# Patient Record
Sex: MALE | Race: WHITE | Employment: FULL TIME | URBAN - METROPOLITAN AREA
[De-identification: names, ages, dates, MRNs, and addresses within clinical notes are randomized per-mention and may not be internally consistent; named-entity substitution may affect disease eponyms.]

---

## 2024-06-25 ENCOUNTER — HOSPITAL ENCOUNTER (EMERGENCY)
Age: 59
Discharge: HOME | End: 2024-06-25
Attending: EMERGENCY MEDICINE
Payer: COMMERCIAL

## 2024-06-25 ENCOUNTER — APPOINTMENT (OUTPATIENT)
Dept: GENERAL RADIOLOGY | Age: 59
End: 2024-06-25
Payer: COMMERCIAL

## 2024-06-25 VITALS
SYSTOLIC BLOOD PRESSURE: 167 MMHG | RESPIRATION RATE: 20 BRPM | HEIGHT: 72 IN | TEMPERATURE: 98.4 F | BODY MASS INDEX: 37.93 KG/M2 | WEIGHT: 280 LBS | HEART RATE: 86 BPM | OXYGEN SATURATION: 99 % | DIASTOLIC BLOOD PRESSURE: 89 MMHG

## 2024-06-25 DIAGNOSIS — I10 UNCONTROLLED HYPERTENSION: Primary | ICD-10-CM

## 2024-06-25 LAB
ANION GAP SERPL CALCULATED.3IONS-SCNC: 12 MMOL/L (ref 9–17)
BASOPHILS # BLD: 0.03 K/UL (ref 0–0.2)
BASOPHILS NFR BLD: 0 % (ref 0–2)
BILIRUB UR QL STRIP: NEGATIVE
BUN SERPL-MCNC: 13 MG/DL (ref 6–20)
BUN/CREAT SERPL: 16 (ref 9–20)
CALCIUM SERPL-MCNC: 8.9 MG/DL (ref 8.6–10.4)
CHLORIDE SERPL-SCNC: 100 MMOL/L (ref 98–107)
CLARITY UR: CLEAR
CO2 SERPL-SCNC: 23 MMOL/L (ref 20–31)
COLOR UR: YELLOW
COMMENT: ABNORMAL
CREAT SERPL-MCNC: 0.8 MG/DL (ref 0.7–1.2)
EOSINOPHIL # BLD: 0.1 K/UL (ref 0–0.44)
EOSINOPHILS RELATIVE PERCENT: 1 % (ref 1–4)
ERYTHROCYTE [DISTWIDTH] IN BLOOD BY AUTOMATED COUNT: 12.6 % (ref 11.8–14.4)
GFR, ESTIMATED: >90 ML/MIN/1.73M2
GLUCOSE SERPL-MCNC: 303 MG/DL (ref 70–99)
GLUCOSE UR STRIP-MCNC: ABNORMAL MG/DL
HCT VFR BLD AUTO: 42.9 % (ref 40.7–50.3)
HGB BLD-MCNC: 15 G/DL (ref 13–17)
HGB UR QL STRIP.AUTO: NEGATIVE
IMM GRANULOCYTES # BLD AUTO: 0.01 K/UL (ref 0–0.3)
IMM GRANULOCYTES NFR BLD: 0 %
KETONES UR STRIP-MCNC: NEGATIVE MG/DL
LEUKOCYTE ESTERASE UR QL STRIP: NEGATIVE
LYMPHOCYTES NFR BLD: 1.8 K/UL (ref 1.1–3.7)
LYMPHOCYTES RELATIVE PERCENT: 26 % (ref 24–43)
MCH RBC QN AUTO: 28.3 PG (ref 25.2–33.5)
MCHC RBC AUTO-ENTMCNC: 35 G/DL (ref 28.4–34.8)
MCV RBC AUTO: 80.9 FL (ref 82.6–102.9)
MONOCYTES NFR BLD: 0.49 K/UL (ref 0.1–1.2)
MONOCYTES NFR BLD: 7 % (ref 3–12)
NEUTROPHILS NFR BLD: 66 % (ref 36–65)
NEUTS SEG NFR BLD: 4.63 K/UL (ref 1.5–8.1)
NITRITE UR QL STRIP: NEGATIVE
NRBC BLD-RTO: 0 PER 100 WBC
PH UR STRIP: 6 [PH] (ref 5–8)
PLATELET # BLD AUTO: 187 K/UL (ref 138–453)
PMV BLD AUTO: 12.5 FL (ref 8.1–13.5)
POTASSIUM SERPL-SCNC: 4 MMOL/L (ref 3.7–5.3)
PROT UR STRIP-MCNC: NEGATIVE MG/DL
RBC # BLD AUTO: 5.3 M/UL (ref 4.21–5.77)
RBC # BLD: ABNORMAL 10*6/UL
SODIUM SERPL-SCNC: 135 MMOL/L (ref 135–144)
SP GR UR STRIP: 1.01 (ref 1–1.03)
TROPONIN I SERPL HS-MCNC: 18 NG/L (ref 0–22)
TSH SERPL DL<=0.05 MIU/L-ACNC: 2.96 UIU/ML (ref 0.3–5)
UROBILINOGEN UR STRIP-ACNC: NORMAL EU/DL (ref 0–1)
WBC OTHER # BLD: 7.1 K/UL (ref 3.5–11.3)

## 2024-06-25 PROCEDURE — 6370000000 HC RX 637 (ALT 250 FOR IP): Performed by: EMERGENCY MEDICINE

## 2024-06-25 PROCEDURE — 85025 COMPLETE CBC W/AUTO DIFF WBC: CPT

## 2024-06-25 PROCEDURE — 80048 BASIC METABOLIC PNL TOTAL CA: CPT

## 2024-06-25 PROCEDURE — 93005 ELECTROCARDIOGRAM TRACING: CPT | Performed by: EMERGENCY MEDICINE

## 2024-06-25 PROCEDURE — 71045 X-RAY EXAM CHEST 1 VIEW: CPT

## 2024-06-25 PROCEDURE — 84484 ASSAY OF TROPONIN QUANT: CPT

## 2024-06-25 PROCEDURE — 81003 URINALYSIS AUTO W/O SCOPE: CPT

## 2024-06-25 PROCEDURE — 2580000003 HC RX 258: Performed by: EMERGENCY MEDICINE

## 2024-06-25 PROCEDURE — 99285 EMERGENCY DEPT VISIT HI MDM: CPT

## 2024-06-25 PROCEDURE — 84443 ASSAY THYROID STIM HORMONE: CPT

## 2024-06-25 RX ORDER — ATORVASTATIN CALCIUM 20 MG/1
20 TABLET, FILM COATED ORAL DAILY
Qty: 90 TABLET | Refills: 1 | Status: SHIPPED | OUTPATIENT
Start: 2024-06-25

## 2024-06-25 RX ORDER — AMLODIPINE BESYLATE 10 MG/1
10 TABLET ORAL DAILY
Qty: 90 TABLET | Refills: 1 | Status: SHIPPED | OUTPATIENT
Start: 2024-06-25

## 2024-06-25 RX ORDER — FINASTERIDE 1 MG/1
1 TABLET, FILM COATED ORAL DAILY
Qty: 30 TABLET | Refills: 0 | Status: SHIPPED | OUTPATIENT
Start: 2024-06-25 | End: 2024-07-25

## 2024-06-25 RX ORDER — LEVOTHYROXINE SODIUM 0.05 MG/1
50 TABLET ORAL DAILY
Qty: 90 TABLET | Refills: 1 | Status: SHIPPED | OUTPATIENT
Start: 2024-06-25

## 2024-06-25 RX ORDER — 0.9 % SODIUM CHLORIDE 0.9 %
1000 INTRAVENOUS SOLUTION INTRAVENOUS ONCE
Status: COMPLETED | OUTPATIENT
Start: 2024-06-25 | End: 2024-06-25

## 2024-06-25 RX ADMIN — SODIUM CHLORIDE 1000 ML: 9 INJECTION, SOLUTION INTRAVENOUS at 19:55

## 2024-06-25 RX ADMIN — METFORMIN HYDROCHLORIDE 500 MG: 500 TABLET ORAL at 22:55

## 2024-06-25 ASSESSMENT — PAIN - FUNCTIONAL ASSESSMENT: PAIN_FUNCTIONAL_ASSESSMENT: NONE - DENIES PAIN

## 2024-06-25 NOTE — ED NOTES
Patient arrived to ED via EMS with c/o hypertension. Patient reported he was at work when he reported he had a loss of coordination and difficulties with zipping up his work vest zipper. Patient reported he was confused as to how to zip up the zipper. Upon arrival with EMS they reported his systolic blood pressure was 210. After three doses of Nitroglycerin, his systolic blood pressure decreased to 160. EMS initiated a 20 gauge peripheral IV in his right A/C. Patient reported he has history of diabetes, however he is not compliant with any of his medications as he does not have a PCP for two years. Therefore he has not been refilling his blood pressure medications or his diabetic medications. Patient reported he does not check his blood sugar regularly. Patient is A&Ox4, patient denies pain at this time. Patient denies chest pain. Patient denies SOB, his respirations are equal and non-labored with no use of accessory muscles.     Call light within reach, cardiac monitor initiated. Awaiting doctors orders.

## 2024-06-26 LAB
EKG ATRIAL RATE: 98 BPM
EKG P AXIS: 45 DEGREES
EKG P-R INTERVAL: 158 MS
EKG Q-T INTERVAL: 380 MS
EKG QRS DURATION: 96 MS
EKG QTC CALCULATION (BAZETT): 485 MS
EKG R AXIS: 6 DEGREES
EKG T AXIS: 39 DEGREES
EKG VENTRICULAR RATE: 98 BPM

## 2024-06-26 NOTE — ED PROVIDER NOTES
"    Preventive Care at the 12 - 14 Year Visit    Growth Percentiles & Measurements   Weight: 78 lbs 4.8 oz / 35.5 kg (actual weight) / 2 %ile based on CDC 2-20 Years weight-for-age data using vitals from 7/2/2018.  Length: 4' 11\" / 149.9 cm 6 %ile based on CDC 2-20 Years stature-for-age data using vitals from 7/2/2018.   BMI: Body mass index is 15.81 kg/(m^2). 5 %ile based on CDC 2-20 Years BMI-for-age data using vitals from 7/2/2018.   Blood Pressure: Blood pressure percentiles are 12.6 % systolic and 60.4 % diastolic based on the August 2017 AAP Clinical Practice Guideline.    Next Visit    Continue to see your health care provider every year for preventive care.    Nutrition    It s very important to eat breakfast. This will help you make it through the morning.    Sit down with your family for a meal on a regular basis.    Eat healthy meals and snacks, including fruits and vegetables. Avoid salty and sugary snack foods.    Be sure to eat foods that are high in calcium and iron.    Avoid or limit caffeine (often found in soda pop).    Sleeping    Your body needs about 9 hours of sleep each night.    Keep screens (TV, computer, and video) out of the bedroom / sleeping area.  They can lead to poor sleep habits and increased obesity.    Health    Limit TV, computer and video time to one to two hours per day.    Set a goal to be physically fit.  Do some form of exercise every day.  It can be an active sport like skating, running, swimming, team sports, etc.    Try to get 30 to 60 minutes of exercise at least three times a week.    Make healthy choices: don t smoke or drink alcohol; don t use drugs.    In your teen years, you can expect . . .    To develop or strengthen hobbies.    To build strong friendships.    To be more responsible for yourself and your actions.    To be more independent.    To use words that best express your thoughts and feelings.    To develop self-confidence and a sense of self.    To see big " EMERGENCY DEPARTMENT ENCOUNTER    Pt Name: Cortez Guillory  MRN: [id number]  Birthdate 1965  Date of evaluation: 6/25/24  CHIEF COMPLAINT       Chief Complaint   Patient presents with    Hypertension     210/100's for EMS     HISTORY OF PRESENT ILLNESS   The history is provided by the patient and medical records.  The patient is a 58-year-old male who is brought in by ambulance for hypertension and decreased R hand coordination.  Symptoms started earlier this evening at work.  He states he was having a hard time using his key in his locker.  He also reports difficulty zipping up his uniform.  He subsequently called 911 and EMS reports a blood pressure of 210/100.  He also states that since moving to [place] for work 2 years ago he has not seen a doctor, therefore has not taken his prescription medications for high blood pressure, diabetes and thyroid disease.    REVIEW OF SYSTEMS     Review of Systems  All other systems reviewed and are negative.    PASTMEDICAL HISTORY     Past Medical History:   Diagnosis Date    Diabetes mellitus (HCC)     Hypertension     Thyroid disease      Past Problem List  There is no problem list on file for this patient.    SURGICAL HISTORY       Past Surgical History:   Procedure Laterality Date    NOSE SURGERY      TONSILLECTOMY       CURRENT MEDICATIONS       Previous Medications    No medications on file     ALLERGIES     has No Known Allergies.  FAMILY HISTORY     has no family status information on file.      SOCIAL HISTORY       Social History     Tobacco Use    Smoking status: Never    Smokeless tobacco: Never   Substance Use Topics    Alcohol use: Not Currently    Drug use: Never     PHYSICAL EXAM     INITIAL VITALS: BP (!) 167/89   Pulse 86   Temp 98.4 °F (36.9 °C) (Oral)   Resp 20   Ht 1.829 m (6')   Wt 127 kg (280 lb)   SpO2 99%   BMI 37.97 kg/m²    Physical Exam  Constitutional:       Appearance: Normal appearance.   HENT:      Head: Normocephalic.   Eyes:       differences in how you and your friends grow and develop.    To have body odor from perspiration (sweating).  Use underarm deodorant each day.    To have some acne, sometimes or all the time.  (Talk with your doctor or nurse about this.)    Girls will usually begin puberty about two years before boys.  o Girls will develop breasts and pubic hair. They will also start their menstrual periods.  o Boys will develop a larger penis and testicles, as well as pubic hair. Their voices will change, and they ll start to have  wet dreams.     Sexuality    It is normal to have sexual feelings.    Find a supportive person who can answer questions about puberty, sexual development, sex, abstinence (choosing not to have sex), sexually transmitted diseases (STDs) and birth control.    Think about how you can say no to sex.    Safety    Accidents are the greatest threat to your health and life.    Always wear a seat belt in the car.    Practice a fire escape plan at home.  Check smoke detector batteries twice a year.    Keep electric items (like blow dryers, razors, curling irons, etc.) away from water.    Wear a helmet and other protective gear when bike riding, skating, skateboarding, etc.    Use sunscreen to reduce your risk of skin cancer.    Learn first aid and CPR (cardiopulmonary resuscitation).    Avoid dangerous behaviors and situations.  For example, never get in a car if the  has been drinking or using drugs.    Avoid peers who try to pressure you into risky activities.    Learn skills to manage stress, anger and conflict.    Do not use or carry any kind of weapon.    Find a supportive person (teacher, parent, health provider, counselor) whom you can talk to when you feel sad, angry, lonely or like hurting yourself.    Find help if you are being abused physically or sexually, or if you fear being hurt by others.    As a teenager, you will be given more responsibility for your health and health care decisions.  While  your parent or guardian still has an important role, you will likely start spending some time alone with your health care provider as you get older.  Some teen health issues are actually considered confidential, and are protected by law.  Your health care team will discuss this and what it means with you.  Our goal is for you to become comfortable and confident caring for your own health.  ==============================================================

## 2024-06-26 NOTE — DISCHARGE INSTRUCTIONS
Return to this emergency room immediately if your symptoms persist, worsen or if new ones form.    Make sure you follow-up with your primary care doctor within the next 1-2 business days.    If you need to find a new physician call the hospital at [phone].